# Patient Record
Sex: MALE | Race: BLACK OR AFRICAN AMERICAN | NOT HISPANIC OR LATINO | URBAN - METROPOLITAN AREA
[De-identification: names, ages, dates, MRNs, and addresses within clinical notes are randomized per-mention and may not be internally consistent; named-entity substitution may affect disease eponyms.]

---

## 2022-02-17 ENCOUNTER — EMERGENCY (EMERGENCY)
Facility: HOSPITAL | Age: 22
LOS: 1 days | Discharge: HOME | End: 2022-02-17
Attending: EMERGENCY MEDICINE | Admitting: EMERGENCY MEDICINE
Payer: COMMERCIAL

## 2022-02-17 VITALS
SYSTOLIC BLOOD PRESSURE: 100 MMHG | OXYGEN SATURATION: 99 % | HEART RATE: 67 BPM | DIASTOLIC BLOOD PRESSURE: 66 MMHG | TEMPERATURE: 99 F | RESPIRATION RATE: 16 BRPM

## 2022-02-17 VITALS
WEIGHT: 145.06 LBS | TEMPERATURE: 98 F | HEART RATE: 71 BPM | RESPIRATION RATE: 18 BRPM | HEIGHT: 68 IN | SYSTOLIC BLOOD PRESSURE: 117 MMHG | DIASTOLIC BLOOD PRESSURE: 73 MMHG | OXYGEN SATURATION: 98 %

## 2022-02-17 LAB
ALBUMIN SERPL ELPH-MCNC: 3.7 G/DL — SIGNIFICANT CHANGE UP (ref 3.3–5)
ALP SERPL-CCNC: 43 U/L — SIGNIFICANT CHANGE UP (ref 40–120)
ALT FLD-CCNC: 21 U/L — SIGNIFICANT CHANGE UP (ref 12–78)
ANION GAP SERPL CALC-SCNC: 4 MMOL/L — LOW (ref 5–17)
APTT BLD: 32.8 SEC — SIGNIFICANT CHANGE UP (ref 27.5–35.5)
AST SERPL-CCNC: 20 U/L — SIGNIFICANT CHANGE UP (ref 15–37)
BASOPHILS # BLD AUTO: 0 K/UL — SIGNIFICANT CHANGE UP (ref 0–0.2)
BASOPHILS NFR BLD AUTO: 0 % — SIGNIFICANT CHANGE UP (ref 0–2)
BILIRUB SERPL-MCNC: 0.2 MG/DL — SIGNIFICANT CHANGE UP (ref 0.2–1.2)
BUN SERPL-MCNC: 16 MG/DL — SIGNIFICANT CHANGE UP (ref 7–23)
CALCIUM SERPL-MCNC: 8.3 MG/DL — LOW (ref 8.5–10.1)
CHLORIDE SERPL-SCNC: 107 MMOL/L — SIGNIFICANT CHANGE UP (ref 96–108)
CO2 SERPL-SCNC: 30 MMOL/L — SIGNIFICANT CHANGE UP (ref 22–31)
CREAT SERPL-MCNC: 1.1 MG/DL — SIGNIFICANT CHANGE UP (ref 0.5–1.3)
EOSINOPHIL # BLD AUTO: 0 K/UL — SIGNIFICANT CHANGE UP (ref 0–0.5)
EOSINOPHIL NFR BLD AUTO: 0 % — SIGNIFICANT CHANGE UP (ref 0–6)
ERYTHROCYTE [SEDIMENTATION RATE] IN BLOOD: 2 MM/HR — SIGNIFICANT CHANGE UP (ref 0–15)
GLUCOSE SERPL-MCNC: 127 MG/DL — HIGH (ref 70–99)
HCT VFR BLD CALC: 43.4 % — SIGNIFICANT CHANGE UP (ref 39–50)
HGB BLD-MCNC: 14.6 G/DL — SIGNIFICANT CHANGE UP (ref 13–17)
INR BLD: 1.04 RATIO — SIGNIFICANT CHANGE UP (ref 0.88–1.16)
LYMPHOCYTES # BLD AUTO: 1.02 K/UL — SIGNIFICANT CHANGE UP (ref 1–3.3)
LYMPHOCYTES # BLD AUTO: 41 % — SIGNIFICANT CHANGE UP (ref 13–44)
MCHC RBC-ENTMCNC: 29.5 PG — SIGNIFICANT CHANGE UP (ref 27–34)
MCHC RBC-ENTMCNC: 33.6 GM/DL — SIGNIFICANT CHANGE UP (ref 32–36)
MCV RBC AUTO: 87.7 FL — SIGNIFICANT CHANGE UP (ref 80–100)
MONOCYTES # BLD AUTO: 0.27 K/UL — SIGNIFICANT CHANGE UP (ref 0–0.9)
MONOCYTES NFR BLD AUTO: 11 % — SIGNIFICANT CHANGE UP (ref 2–14)
NEUTROPHILS # BLD AUTO: 0.97 K/UL — LOW (ref 1.8–7.4)
NEUTROPHILS NFR BLD AUTO: 39 % — LOW (ref 43–77)
NRBC # BLD: SIGNIFICANT CHANGE UP /100 WBCS (ref 0–0)
PLATELET # BLD AUTO: 110 K/UL — LOW (ref 150–400)
POTASSIUM SERPL-MCNC: 3.5 MMOL/L — SIGNIFICANT CHANGE UP (ref 3.5–5.3)
POTASSIUM SERPL-SCNC: 3.5 MMOL/L — SIGNIFICANT CHANGE UP (ref 3.5–5.3)
PROT SERPL-MCNC: 6.7 G/DL — SIGNIFICANT CHANGE UP (ref 6–8.3)
PROTHROM AB SERPL-ACNC: 12.2 SEC — SIGNIFICANT CHANGE UP (ref 10.6–13.6)
RBC # BLD: 4.95 M/UL — SIGNIFICANT CHANGE UP (ref 4.2–5.8)
RBC # BLD: 4.95 M/UL — SIGNIFICANT CHANGE UP (ref 4.2–5.8)
RBC # FLD: 12.7 % — SIGNIFICANT CHANGE UP (ref 10.3–14.5)
RETICS #: 40.1 K/UL — SIGNIFICANT CHANGE UP (ref 25–125)
RETICS/RBC NFR: 0.8 % — SIGNIFICANT CHANGE UP (ref 0.5–2.5)
SARS-COV-2 RNA SPEC QL NAA+PROBE: DETECTED
SODIUM SERPL-SCNC: 141 MMOL/L — SIGNIFICANT CHANGE UP (ref 135–145)
WBC # BLD: 2.49 K/UL — LOW (ref 3.8–10.5)
WBC # FLD AUTO: 2.49 K/UL — LOW (ref 3.8–10.5)

## 2022-02-17 PROCEDURE — 87635 SARS-COV-2 COVID-19 AMP PRB: CPT

## 2022-02-17 PROCEDURE — 85730 THROMBOPLASTIN TIME PARTIAL: CPT

## 2022-02-17 PROCEDURE — 99283 EMERGENCY DEPT VISIT LOW MDM: CPT | Mod: 25

## 2022-02-17 PROCEDURE — 71046 X-RAY EXAM CHEST 2 VIEWS: CPT

## 2022-02-17 PROCEDURE — 80053 COMPREHEN METABOLIC PANEL: CPT

## 2022-02-17 PROCEDURE — 86900 BLOOD TYPING SEROLOGIC ABO: CPT

## 2022-02-17 PROCEDURE — 86850 RBC ANTIBODY SCREEN: CPT

## 2022-02-17 PROCEDURE — 99284 EMERGENCY DEPT VISIT MOD MDM: CPT

## 2022-02-17 PROCEDURE — 71046 X-RAY EXAM CHEST 2 VIEWS: CPT | Mod: 26

## 2022-02-17 PROCEDURE — 86901 BLOOD TYPING SEROLOGIC RH(D): CPT

## 2022-02-17 PROCEDURE — 85045 AUTOMATED RETICULOCYTE COUNT: CPT

## 2022-02-17 PROCEDURE — 85610 PROTHROMBIN TIME: CPT

## 2022-02-17 PROCEDURE — 85025 COMPLETE CBC W/AUTO DIFF WBC: CPT

## 2022-02-17 PROCEDURE — 85652 RBC SED RATE AUTOMATED: CPT

## 2022-02-17 PROCEDURE — 36415 COLL VENOUS BLD VENIPUNCTURE: CPT

## 2022-02-17 PROCEDURE — 86140 C-REACTIVE PROTEIN: CPT

## 2022-02-17 NOTE — ED PROVIDER NOTE - ATTENDING CONTRIBUTION TO CARE
21 male presents to ER with mother c/o abnormal blood work, had routine testing done at [facility] office 1/25/22, was noted to have a low white count and platelets, labs were repeated 2/16/22 and received a phone call that again was abnormal, patient is not covid vaccinated, had tactile fever and chills a few days ago with body aches, otherwise feeling well, patient alert and oriented, no acute distress, heart and lungs clear, adbomen soft, f/u labs, call heme/onc.

## 2022-02-17 NOTE — ED PROVIDER NOTE - OBJECTIVE STATEMENT
21 year old male presents with abnormal blood work, sent in by PCP. had routine blood work Jan 25 which showed WBC of 3.3 and Platelets 123. had repeat CBC yesterday to recheck the labs and WBC 1.9 and platelets 92. sent to ED to "eval for acute Hodgkin's lymphoma vs myelodysplasia". reports subjective low grade fever past few days (no fever today and did not take temp). mild malaise yesterday but contributed it to not eating much. denies any pain or complaints today. denies recent illnesses. not covid vaccinated   PCP cristian Ponce 21 year old male presents with abnormal blood work, sent in by PCP. had routine blood work Jan 25 which showed WBC of 3.3 and Platelets 123. had repeat CBC yesterday to recheck the labs and WBC 1.9 and platelets 92. sent to ED to "eval for acute Hodgkin's lymphoma vs myelodysplasia". reports subjective low grade fever past few days (no fever today and did not take temp). mild malaise yesterday but contributed it to not eating much. denies any pain or complaints today. "feeling back to normal". denies recent illnesses. not covid vaccinated   PCP cristian Ponce

## 2022-02-17 NOTE — ED PROVIDER NOTE - NSFOLLOWUPINSTRUCTIONS_ED_ALL_ED_FT
drink plenty of fluids  tylenol over the counter for pain  have repeat blood work 2 weeks by primary care provider   follow up with hematologist 1 month for recheck if lab abnormalities do not resolve   return to ED for worsening symptoms       COVID-19 (Coronavirus Disease 2019)    WHAT YOU NEED TO KNOW:    COVID-19 is the disease caused by a coronavirus first discovered in December 2019. Coronaviruses generally cause upper respiratory (nose, throat, and lung) infections, such as a cold. The 2019 virus spreads quickly and easily. It can be spread starting 2 to 3 days before symptoms even begin.    DISCHARGE INSTRUCTIONS:    Call your local emergency number (911 in the ) if:   •You have trouble breathing or shortness of breath at rest.      •You have chest pain or pressure that lasts longer than 5 minutes.      •You become confused or hard to wake.      •Your lips or face are blue.      Return to the emergency department if:   •You have a fever of 104°F (40°C) or higher.          Call your doctor if:   •You have symptoms of COVID-19.      •You have questions or concerns about your condition or care.      Medicines: You may need any of the following:   •Decongestants help reduce nasal congestion and help you breathe more easily. If you take decongestant pills, they may make you feel restless or cause problems with your sleep. Do not use decongestant sprays for more than a few days.      •Cough suppressants help reduce coughing. Ask your healthcare provider which type of cough medicine is best for you.      •Acetaminophen decreases pain and fever. It is available without a doctor's order. Ask how much to take and how often to take it. Follow directions. Read the labels of all other medicines you are using to see if they also contain acetaminophen, or ask your doctor or pharmacist. Acetaminophen can cause liver damage if not taken correctly. Do not use more than 4 grams (4,000 milligrams) total of acetaminophen in one day.       •NSAIDs, such as ibuprofen, help decrease swelling, pain, and fever. This medicine is available with or without a doctor's order. NSAIDs can cause stomach bleeding or kidney problems in certain people. If you take blood thinner medicine, always ask your healthcare provider if NSAIDs are safe for you. Always read the medicine label and follow directions.      •Blood thinners help prevent blood clots. Clots can cause strokes, heart attacks, and death. The following are general safety guidelines to follow while you are taking a blood thinner:?Watch for bleeding and bruising while you take blood thinners. Watch for bleeding from your gums or nose. Watch for blood in your urine and bowel movements. Use a soft washcloth on your skin, and a soft toothbrush to brush your teeth. This can keep your skin and gums from bleeding. If you shave, use an electric shaver. Do not play contact sports.       ?Tell your dentist and other healthcare providers that you take a blood thinner. Wear a bracelet or necklace that says you take this medicine.       ?Do not start or stop any other medicines unless your healthcare provider tells you to. Many medicines cannot be used with blood thinners.      ?Take your blood thinner exactly as prescribed by your healthcare provider. Do not skip does or take less than prescribed. Tell your provider right away if you forget to take your blood thinner, or if you take too much.      ?Warfarin is a blood thinner that you may need to take. The following are things you should be aware of if you take warfarin: ?Foods and medicines can affect the amount of warfarin in your blood. Do not make major changes to your diet while you take warfarin. Warfarin works best when you eat about the same amount of vitamin K every day. Vitamin K is found in green leafy vegetables and certain other foods. Ask for more information about what to eat when you are taking warfarin.      ?You will need to see your healthcare provider for follow-up visits when you are on warfarin. You will need regular blood tests. These tests are used to decide how much medicine you need.         •Take your medicine as directed. Contact your healthcare provider if you think your medicine is not helping or if you have side effects. Tell him or her if you are allergic to any medicine. Keep a list of the medicines, vitamins, and herbs you take. Include the amounts, and when and why you take them. Bring the list or the pill bottles to follow-up visits. Carry your medicine list with you in case of an emergency.      What you need to know about variants: The virus has changed into several new forms (called variants) since it was discovered. The variants may be more contagious (easily spread) than the original form. Some may also cause more severe illness than others.    What you need to know about COVID-19 vaccines: Healthcare providers recommend a COVID-19 vaccine, even if you have already had COVID-19. You are considered fully vaccinated against COVID-19 two weeks after the final dose of any COVID-19 vaccine. Let your healthcare provider know when you have received the final dose of the vaccine. Make a copy of your vaccination card. Keep the original with you in case you need to show it. Keep the copy in a safe place.  •COVID-19 vaccines are given as a shot in 1 or 2 doses.   Vaccination is recommended for everyone 5 years or older. One 2-dose vaccine is fully approved for those 16 or older. This vaccine also has an emergency use authorization (EUA) for children 5 to 15 years old. No vaccine is currently available for children younger than 5 years. A booster (additional) dose is given to help the immune system continue to protect against severe COVID-19.?A booster is recommended for all adults 18 or older. The booster can be a different brand of the COVID-19 vaccine than you originally received. The timing for the booster depends on the type of vaccine you received:?1-dose vaccine: The booster is given at least 2 months after you received the vaccine.      ?2-dose vaccine: The booster is given at least 6 months after the second dose.      ?A booster can be given to adolescents 16 to 17 years old. Only 1 COVID-19 vaccine has an EUA for adolescent boosters. The booster is given at least 6 months after the second dose of the original vaccine series.    COVID-19 Immunization Schedule         •Continue social distancing and other measures, even after you get the vaccine. Although it is not common, you can become infected after you get the vaccine. You may also be able to pass the virus to others without knowing you are infected.      •After you get the vaccine, check local, national, and international travel rules. You may need to be tested before you travel. Some countries require proof of a negative test before you travel. You may also need to quarantine after you return.      How the 2019 coronavirus spreads:   •Droplets are the main way all coronaviruses spread. The virus travels in droplets that form when a person talks, sings, coughs, or sneezes. The droplets can also float in the air for minutes or hours. Infection happens when you breathe in the droplets or get them in your eyes or nose. Close personal contact with an infected person increases your risk for infection. This means being within 6 feet (2 meters) of the person for at least 15 minutes over 24 hours.      •Person-to-person contact can spread the virus. For example, a person with the virus on his or her hands can spread it by shaking hands with someone.      •The virus can stay on objects and surfaces for up to 3 days. You may become infected by touching the object or surface and then touching your eyes or mouth.      Help lower the risk for COVID-19:   •Wash your hands often throughout the day. Use soap and water. Rub your soapy hands together, lacing your fingers, for at least 20 seconds. Rinse with warm, running water. Dry your hands with a clean towel or paper towel. Use hand  that contains alcohol if soap and water are not available. Teach children how to wash their hands and use hand .  Handwashing           •Cover sneezes and coughs. Turn your face away and cover your mouth and nose with a tissue. Throw the tissue away. Use the bend of your arm if a tissue is not available. Then wash your hands well with soap and water or use hand . Teach children how to cover a cough or sneeze.      •Wear a face covering (mask) when needed. Use a cloth covering with at least 2 layers. You can also create layers by putting a cloth covering over a disposable non-medical mask. Cover your mouth and your nose.  How to Wear a Face Covering (Mask)           •Follow worldwide, national, and local social distancing guidelines. Keep at least 6 feet (2 meters) between you and others.      •Try not to touch your face. If you get the virus on your hands, you can transfer it to your eyes, nose, or mouth and become infected. You can also transfer it to objects, surfaces, or people.      •Clean and disinfect high-touch surfaces and objects often. Use disinfecting wipes, or make a solution of 4 teaspoons of bleach in 1 quart (4 cups) of water.      •Ask about other vaccines you may need. Get the influenza (flu) vaccine as soon as recommended each year, usually starting in September or October. Get the pneumonia vaccine if recommended. Your healthcare provider can tell you if you should also get other vaccines, and when to get them.    Prevent COVID-19 Infection         Follow social distancing guidelines: National and local social distancing rules vary. Rules and restrictions may change over time as restrictions are lifted. The following are general guidelines:   •Stay home if you are sick or think you may have COVID-19. It is important to stay home if you are waiting for a testing appointment or for test results.      •Avoid close physical contact with anyone who does not live in your home. Do not shake hands with, hug, or kiss a person as a greeting. If you must use public transportation (such as a bus or subway), try to sit or stand away from others. Wear your face covering.      •Avoid in-person gatherings and crowds. Attend virtually if possible.      Follow up with your doctor as directed: Write down your questions so you remember to ask them during your visits.    For more information:   •Centers for Disease Control and Prevention  [address] [place] [address]  Phone: 1-625.967.5168  Web Address: http://www.cdc.gov

## 2022-02-17 NOTE — ED PROVIDER NOTE - CARE PROVIDER_API CALL
Keven Ponce (DO)  Family Medicine  [address] [place], [place] [address]  Phone: [phone]  Fax: [phone]  Follow Up Time: 1-3 Days   Keven Ponce (DO)  Family Medicine  [address] [place], [place] [address]  Phone: [phone]  Fax: [phone]  Follow Up Time: 1-3 Days    Hon MANSI Cardona (MD)  Hematology; Internal Medicine; Medical Oncology  [address] [facility], [place] [address]  Phone: [phone]  Fax: [phone]  Follow Up Time: 7-10 Days

## 2022-02-17 NOTE — ED PROVIDER NOTE - PATIENT PORTAL LINK FT
You can access the FollowMyHealth Patient Portal offered by [facility] by registering at the following website: [email or web address]. By joining [facility]’s FollowMyHealth portal, you will also be able to view your health information using other applications (apps) compatible with our system.

## 2022-02-17 NOTE — ED PROVIDER NOTE - PROVIDER TOKENS
PROVIDER:[TOKEN:[[id number]],FOLLOWUP:[1-3 Days]] PROVIDER:[TOKEN:[[id number]],FOLLOWUP:[1-3 Days]],PROVIDER:[TOKEN:[[id number]],FOLLOWUP:[7-10 Days]]

## 2022-02-17 NOTE — ED PROVIDER NOTE - CLINICAL SUMMARY MEDICAL DECISION MAKING FREE TEXT BOX
sent in for abnormal labs (low white count and platelets). denies current pain or complaints. will consult hematology, repeat labs

## 2022-02-17 NOTE — ED PROVIDER NOTE - PROGRESS NOTE DETAILS
spoke with Dr. Cardona (hematology). will see patient in ED. recommended repeat labs including retic count, B12, folic acid, type and screen, PT/INR, and swab for covid. patient resting comfortably. vitals stable. no pain or complaints spoke with Dr. Cardona, has reviewed all lab results. white count and platelet count improved from yesterday. +covid. suspect lab results due to covid. patient resting comfortably and appears well. no current complaints. symptomatic tx for covid discussed. no tachycardia or hypoxia. recommends repeat blood work in 2 weeks and follow up in hematology office 1 month (recommends to have negative rapid covid test before visit). patient and mother comfortable with plan

## 2022-02-17 NOTE — ED PROVIDER NOTE - CARE PLAN
Principal Discharge DX:	Thrombocytopenia  Secondary Diagnosis:	Leukopenia   1 Principal Discharge DX:	2019 novel coronavirus disease (COVID-19)  Secondary Diagnosis:	Leukopenia  Secondary Diagnosis:	Thrombocytopenia due to COVID-19 virus

## 2022-02-18 LAB — CRP SERPL-MCNC: <3 MG/L — SIGNIFICANT CHANGE UP

## 2022-06-26 ENCOUNTER — FORM ENCOUNTER (OUTPATIENT)
Age: 22
End: 2022-06-26

## 2022-06-26 PROBLEM — Z78.9 OTHER SPECIFIED HEALTH STATUS: Chronic | Status: ACTIVE | Noted: 2022-02-17

## 2022-06-27 ENCOUNTER — APPOINTMENT (OUTPATIENT)
Dept: MRI IMAGING | Facility: CLINIC | Age: 22
End: 2022-06-27
Payer: COMMERCIAL

## 2022-06-27 ENCOUNTER — APPOINTMENT (OUTPATIENT)
Dept: ORTHOPEDIC SURGERY | Facility: CLINIC | Age: 22
End: 2022-06-27
Payer: COMMERCIAL

## 2022-06-27 VITALS — WEIGHT: 135 LBS | BODY MASS INDEX: 20.46 KG/M2 | HEIGHT: 68 IN

## 2022-06-27 DIAGNOSIS — S86.011A STRAIN OF RIGHT ACHILLES TENDON, INITIAL ENCOUNTER: ICD-10-CM

## 2022-06-27 DIAGNOSIS — S86.811A STRAIN OF OTHER MUSCLE(S) AND TENDON(S) AT LOWER LEG LEVEL, RIGHT LEG, INITIAL ENCOUNTER: ICD-10-CM

## 2022-06-27 PROCEDURE — 99204 OFFICE O/P NEW MOD 45 MIN: CPT

## 2022-06-27 PROCEDURE — 73610 X-RAY EXAM OF ANKLE: CPT | Mod: RT

## 2022-06-27 PROCEDURE — 73721 MRI JNT OF LWR EXTRE W/O DYE: CPT | Mod: RT

## 2022-06-27 PROCEDURE — 73590 X-RAY EXAM OF LOWER LEG: CPT | Mod: RT

## 2022-06-27 RX ORDER — NAPROXEN 500 MG/1
500 TABLET ORAL TWICE DAILY
Qty: 60 | Refills: 0 | Status: ACTIVE | COMMUNITY
Start: 2022-06-27 | End: 1900-01-01

## 2022-06-27 NOTE — ASSESSMENT
[FreeTextEntry1] : right achilles tear\par \par - The patient was advised of the diagnosis.  The natural history of the pathology was explained to the patient in layman's terms.  Several different treatment options were discussed and explained including the risks and benefits of both surgical and non-surgical treatments.  All questions and concerns were answered.\par - The patient was advised to modify their activities.\par - Patient was given a prescription for an anti-inflammatory medication.  They will take it for the next week and then on an as needed basis, as long as there are no medical contra-indications.  Patient is counseled on possible GI, renal, and cardiovascular side effects.\par - mri to eval tear of achilles\par - boot\par - fu after mri with ankle doc discuss poss surgery

## 2022-06-27 NOTE — HISTORY OF PRESENT ILLNESS
[de-identified] : 21 year old male  ([employer], basketball rec.)   right lower leg pain since 6/24/2022 while playing basketball and felt immediate localized discomfort.  pain located at the area of the achilles with associated swelling and strength, ROM loss.  pt. is currenlty non WB in a splint with crutches provided by [facility].\par

## 2022-06-27 NOTE — IMAGING
[Right] : right tibia/fibula [There are no fractures, subluxations or dislocations. No significant abnormalities are seen] : There are no fractures, subluxations or dislocations. No significant abnormalities are seen [de-identified] : \par \par  RIGHT CALF / ANKLE\par Inspection:  swelling\par Palpation: achilles and calf tenderness\par Knee and Ankle Range of Motion: normal motion but pain with ankle motion\par Strength: \par ankle dorsiflexion strength is 5/5  \par ankle plantar flexion strength is 3+/5  \par ankle inversion strength is 5/5  \par ankle eversion strength is 5/5\par Kingsley test: pos\par Neurovascular intact distally\par Gait: antalgic\par \par

## 2022-06-29 ENCOUNTER — APPOINTMENT (OUTPATIENT)
Dept: ORTHOPEDIC SURGERY | Facility: CLINIC | Age: 22
End: 2022-06-29
Payer: COMMERCIAL

## 2022-06-29 PROCEDURE — 99215 OFFICE O/P EST HI 40 MIN: CPT

## 2022-06-29 NOTE — DATA REVIEWED
[MRI] : MRI [Right] : of the right [Ankle] : ankle [Report was reviewed and noted in the chart] : The report was reviewed and noted in the chart [I independently reviewed and interpreted images and report] : I independently reviewed and interpreted images and report [FreeTextEntry1] : 1. Findings consistent with acute complete Achilles tendon disruption centered 6 cm above the calcaneal \par insertion with a 1.7 cm gap and approximately 2 cm of tendon remaining above the tear with moderate \par surrounding soft tissue swelling consistent with acute traumatic injury.\par 2. Mild posterior tibial tenosynovitis, mild flexor hallucis longus tenosynovitis, and mild deltoid sprain with \par surrounding synovitis with moderate diffuse soft tissue swelling throughout the posterior aspect of the ankle and \par mild plantar fascial thickening without tear.

## 2022-06-29 NOTE — ASSESSMENT
[FreeTextEntry1] : The patient was explained that they have a rupture of the Achilles tendon. Both nonoperative and operative treatment options and associated risks and benefits were discussed with the patient. The patient was explained that both options can be successful.  Nonoperative treatment can take longer to return to full activities, and can be associated with a higher re-rupture rate. The most common complication with surgery is with the incision site having some sort of breakdown and/or infection. The patient has opted for surgical intervention.\par \par The risks and benefits of surgery have been discussed. Risks include but are not limited to bleeding, infection, reaction to anesthesia, injury to blood vessels and nerves, malunion, nonunion, necessity of repeat surgery, chronic pain, loss of limb and death. The patient understands the risks and agrees with the surgical plan. Details of the procedure and postoperative protocol were discussed at length. All questions have been answered.\par \par Continue NWB in CAM boot with heel lifts.\par Ice to affected area. \par

## 2022-06-29 NOTE — PHYSICAL EXAM
[Right] : right foot and ankle [3___] : plantar flexion 3[unfilled]/5 [4___] : eversion 4[unfilled]/5 [5___] : [facility] 5[unfilled]/5 [2+] : posterior tibialis pulse: 2+ [Normal] : saphenous nerve sensation normal [] : no pain when stressing lateral tarsal metatarsal joint [FreeTextEntry3] : Palpable gap achilles tendon.  [de-identified] : NWB in CAM boot, using crutches.  [de-identified] : plantar flexion 30 degrees [de-identified] : inversion 15 degrees [de-identified] : eversion 10 degrees [TWNoteComboBox7] : dorsiflexion 0 degrees

## 2022-06-29 NOTE — HISTORY OF PRESENT ILLNESS
[Sports related] : sports related [Sudden] : sudden [Intermittent] : intermittent [Household chores] : household chores [Leisure] : leisure [Work] : work [Social interactions] : social interactions [Rest] : rest [Sitting] : sitting [Standing] : standing [Walking] : walking [Stairs] : stairs [4] : 4 [Dull/Aching] : dull/aching [Localized] : localized [de-identified] : Pt. is a 21 year old male who presents for evaluation of his RT ankle. Reports injury while playing basketball on 6/24/22. He was evaluated at [facility] and then at [facility] on 6/27/22. He presents today NWB in CAM boot with heel lifts, using crutches. No previous injury/problem with RT ankle. MRI was consistent with achilles rupture.  [] : Post Surgical Visit: no [FreeTextEntry1] : ANY Weeks

## 2022-07-01 ENCOUNTER — LABORATORY RESULT (OUTPATIENT)
Age: 22
End: 2022-07-01

## 2022-07-04 RX ORDER — HYDROCODONE BITARTRATE AND ACETAMINOPHEN 7.5; 325 MG/1; MG/1
7.5-325 TABLET ORAL
Qty: 42 | Refills: 0 | Status: ACTIVE | COMMUNITY
Start: 2022-07-04

## 2022-07-05 ENCOUNTER — APPOINTMENT (OUTPATIENT)
Age: 22
End: 2022-07-05

## 2022-07-05 PROCEDURE — 29515 APPLICATION SHORT LEG SPLINT: CPT | Mod: 59,RT

## 2022-07-05 PROCEDURE — 27650 REPAIR ACHILLES TENDON: CPT | Mod: RT

## 2022-07-11 ENCOUNTER — APPOINTMENT (OUTPATIENT)
Dept: ORTHOPEDIC SURGERY | Facility: CLINIC | Age: 22
End: 2022-07-11

## 2022-07-11 VITALS — BODY MASS INDEX: 20.46 KG/M2 | HEIGHT: 68 IN | WEIGHT: 135 LBS

## 2022-07-11 PROCEDURE — 99024 POSTOP FOLLOW-UP VISIT: CPT

## 2022-07-11 PROCEDURE — 29515 APPLICATION SHORT LEG SPLINT: CPT

## 2022-07-11 NOTE — ASSESSMENT
[FreeTextEntry1] : Splint taken down - soft padding wet down to incision.  Incision clean with Xeroform in place.  Sterile 4x4s placed.  Splint replaced.  NWB.  See Dr. Jackson this week

## 2022-07-11 NOTE — REASON FOR VISIT
[FreeTextEntry2] : SP R Achilles repair with Dr. Jackson on 7/5.  Got his splint wet on 7/8.  No fcs.

## 2022-07-14 ENCOUNTER — APPOINTMENT (OUTPATIENT)
Dept: ORTHOPEDIC SURGERY | Facility: CLINIC | Age: 22
End: 2022-07-14

## 2022-07-14 VITALS — WEIGHT: 135 LBS | BODY MASS INDEX: 20.46 KG/M2 | HEIGHT: 68 IN

## 2022-07-14 DIAGNOSIS — Z78.9 OTHER SPECIFIED HEALTH STATUS: ICD-10-CM

## 2022-07-14 PROCEDURE — 99213 OFFICE O/P EST LOW 20 MIN: CPT

## 2022-07-14 NOTE — HISTORY OF PRESENT ILLNESS
[Sports related] : sports related [Sudden] : sudden [4] : 4 [0] : 0 [Dull/Aching] : dull/aching [Localized] : localized [Intermittent] : intermittent [Household chores] : household chores [Leisure] : leisure [Work] : work [Social interactions] : social interactions [Rest] : rest [Sitting] : sitting [Standing] : standing [Walking] : walking [Stairs] : stairs [Not working due to injury] : Work status: not working due to injury [de-identified] : Patient returns s/p right achilles tendon repair on 7/5/22.  He has been nwb in his splint.  He is taking his daily aspirin.  No fevers. Pain well controlled.  No other complaints.   [] : no [FreeTextEntry1] : ANY Weeks [de-identified] : 7-5-22 [de-identified] : 7=5=22

## 2022-07-14 NOTE — ASSESSMENT
[FreeTextEntry1] : Patient is doing well. Sutures removed today.  He will start active ROM exercises in 5 days.\par He will remain nwb in his cam walker with heel lifts ( he has it at home).  He will continue with the daily aspirin.\par

## 2022-07-14 NOTE — PHYSICAL EXAM
[Right] : right foot and ankle [4___] : eversion 4[unfilled]/5 [2+] : posterior tibialis pulse: 2+ [Normal] : saphenous nerve sensation normal [Mild] : mild swelling over achilles tendon [] : no pain when stressing lateral tarsal metatarsal joint [de-identified] : NWB in CAM boot, using crutches.  [de-identified] : plantar flexion 30 degrees [de-identified] : inversion 15 degrees [de-identified] : eversion 10 degrees [TWNoteComboBox7] : dorsiflexion 0 degrees

## 2022-08-04 ENCOUNTER — APPOINTMENT (OUTPATIENT)
Dept: ORTHOPEDIC SURGERY | Facility: CLINIC | Age: 22
End: 2022-08-04

## 2022-08-04 VITALS — WEIGHT: 135 LBS | BODY MASS INDEX: 20.46 KG/M2 | HEIGHT: 68 IN

## 2022-08-04 PROCEDURE — 99024 POSTOP FOLLOW-UP VISIT: CPT

## 2022-08-04 NOTE — PHYSICAL EXAM
[Right] : right foot and ankle [Mild] : mild swelling over achilles tendon [2+] : posterior tibialis pulse: 2+ [Normal] : saphenous nerve sensation normal [NL (40)] : plantar flexion 40 degrees [NL 30)] : inversion 30 degrees [NL (20)] : eversion 20 degrees [4___] : plantar flexion 4[unfilled]/5 [5___] : eversion 5[unfilled]/5 [] : no pain when stressing lateral tarsal metatarsal joint [de-identified] : Resting tension of achilles tendon is within 5 degrees of the other side. [de-identified] : NWB in CAM boot, using crutches.  [de-identified] : plantar flexion 30 degrees [de-identified] : inversion 15 degrees [de-identified] : eversion 10 degrees [TWNoteComboBox7] : dorsiflexion 10 degrees

## 2022-08-04 NOTE — ASSESSMENT
[FreeTextEntry1] : Patient is doing well. He can now be wbat in his cam walker with heel lifts.  He will start formal PT (with no passive dorsiflexion) and continue with active ROM exercises.  He no longer needs to take the daily aspirin.\par

## 2022-08-04 NOTE — PHYSICAL EXAM
[Right] : right foot and ankle [Mild] : mild swelling over achilles tendon [2+] : posterior tibialis pulse: 2+ [Normal] : saphenous nerve sensation normal [NL (40)] : plantar flexion 40 degrees [NL 30)] : inversion 30 degrees [NL (20)] : eversion 20 degrees [4___] : plantar flexion 4[unfilled]/5 [5___] : eversion 5[unfilled]/5 [] : no pain when stressing lateral tarsal metatarsal joint [de-identified] : Resting tension of achilles tendon is within 5 degrees of the other side. [de-identified] : NWB in CAM boot, using crutches.  [de-identified] : plantar flexion 30 degrees [de-identified] : inversion 15 degrees [de-identified] : eversion 10 degrees [TWNoteComboBox7] : dorsiflexion 10 degrees

## 2022-08-08 NOTE — HISTORY OF PRESENT ILLNESS
[4] : 4 [Dull/Aching] : dull/aching [Localized] : localized [Intermittent] : intermittent [Household chores] : household chores [Leisure] : leisure [Work] : work [Social interactions] : social interactions [Rest] : rest [Sitting] : sitting [Standing] : standing [Walking] : walking [Stairs] : stairs [Not working due to injury] : Work status: not working due to injury [Sports related] : sports related [Sudden] : sudden [0] : 0 [de-identified] : Patient returns s/p right achilles tendon repair on 7/5/22.  He has been nwb in his cam walker and doing home exercises.  He is taking his daily aspirin.  No pain at this time.  No other complaints. [de-identified] : 7-5-22 [de-identified] : 7=5=22 [] : Patient is currently playing sports: no [FreeTextEntry1] : right ankle [FreeTextEntry3] : 06/24/2022 [FreeTextEntry5] : pt was playing basketball and landed wrong

## 2022-08-08 NOTE — HISTORY OF PRESENT ILLNESS
[4] : 4 [Dull/Aching] : dull/aching [Localized] : localized [Intermittent] : intermittent [Household chores] : household chores [Leisure] : leisure [Work] : work [Social interactions] : social interactions [Rest] : rest [Sitting] : sitting [Standing] : standing [Walking] : walking [Stairs] : stairs [Not working due to injury] : Work status: not working due to injury [Sports related] : sports related [Sudden] : sudden [0] : 0 [de-identified] : Patient returns s/p right achilles tendon repair on 7/5/22.  He has been nwb in his cam walker and doing home exercises.  He is taking his daily aspirin.  No pain at this time.  No other complaints. [de-identified] : 7-5-22 [de-identified] : 7=5=22 [] : Patient is currently playing sports: no [FreeTextEntry1] : right ankle [FreeTextEntry3] : 06/24/2022 [FreeTextEntry5] : pt was playing basketball and landed wrong

## 2022-09-01 ENCOUNTER — APPOINTMENT (OUTPATIENT)
Dept: ORTHOPEDIC SURGERY | Facility: CLINIC | Age: 22
End: 2022-09-01

## 2022-09-01 DIAGNOSIS — Z00.00 ENCOUNTER FOR GENERAL ADULT MEDICAL EXAMINATION W/OUT ABNORMAL FINDINGS: ICD-10-CM

## 2022-09-01 PROCEDURE — 99024 POSTOP FOLLOW-UP VISIT: CPT

## 2022-09-01 NOTE — HISTORY OF PRESENT ILLNESS
[Sports related] : sports related [Sudden] : sudden [0] : 0 [Dull/Aching] : dull/aching [Localized] : localized [Intermittent] : intermittent [Household chores] : household chores [Leisure] : leisure [Work] : work [Social interactions] : social interactions [Rest] : rest [Sitting] : sitting [Standing] : standing [Walking] : walking [Stairs] : stairs [Not working due to injury] : Work status: not working due to injury [1] : 2 [de-identified] : Patient returns s/p right achilles tendon repair on 7/5/22.  He has been wb in sneakers and doing PT/home exercises. He reports doing well and is happy with his progress. [] : no [FreeTextEntry1] : ANY Weeks [de-identified] : 7-5-22 [de-identified] : 7/5/22 [de-identified] : right achilles tendon repair

## 2022-09-01 NOTE — ASSESSMENT
[FreeTextEntry1] : Patient continues to do well.  He will continue to be wbat with sneakers.  PT and home exercises to be continued.  No high impact activities.

## 2022-09-01 NOTE — PHYSICAL EXAM
[Right] : right foot and ankle [NL (40)] : plantar flexion 40 degrees [NL 30)] : inversion 30 degrees [NL (20)] : eversion 20 degrees [4___] : plantar flexion 4[unfilled]/5 [5___] : eversion 5[unfilled]/5 [2+] : posterior tibialis pulse: 2+ [Normal] : saphenous nerve sensation normal [] : no pain when stressing lateral tarsal metatarsal joint [de-identified] : Resting tension of achilles tendon is within 5 degrees of the other side. [de-identified] : NWB in CAM boot, using crutches.  [TWNoteComboBox7] : dorsiflexion 15 degrees

## 2022-10-06 ENCOUNTER — APPOINTMENT (OUTPATIENT)
Dept: ORTHOPEDIC SURGERY | Facility: CLINIC | Age: 22
End: 2022-10-06

## 2022-10-06 PROCEDURE — 99213 OFFICE O/P EST LOW 20 MIN: CPT

## 2022-10-06 NOTE — ASSESSMENT
[FreeTextEntry1] : Patient is making consistent progress and will continue to be wbat with sneakers.  PT and home exercises to be continued.  No high impact activities.

## 2022-10-06 NOTE — HISTORY OF PRESENT ILLNESS
[Sports related] : sports related [Sudden] : sudden [1] : 2 [0] : 0 [Dull/Aching] : dull/aching [Localized] : localized [Intermittent] : intermittent [Household chores] : household chores [Leisure] : leisure [Work] : work [Social interactions] : social interactions [Rest] : rest [Sitting] : sitting [Standing] : standing [Walking] : walking [Stairs] : stairs [Not working due to injury] : Work status: not working due to injury [de-identified] : Patient returns s/p right achilles tendon repair on 7/5/22.  He has been wb in sneakers and doing PT/home exercises. He reports consistent improvement.  No complaints today. [] : no [FreeTextEntry1] : ANY Weeks [de-identified] : 7-5-22 [de-identified] : 7/5/22 [de-identified] : right achilles tendon repair

## 2022-10-06 NOTE — PHYSICAL EXAM
[Right] : right foot and ankle [NL (40)] : plantar flexion 40 degrees [NL 30)] : inversion 30 degrees [NL (20)] : eversion 20 degrees [4___] : plantar flexion 4[unfilled]/5 [5___] : eversion 5[unfilled]/5 [2+] : posterior tibialis pulse: 2+ [Normal] : saphenous nerve sensation normal [] : no pain when stressing lateral tarsal metatarsal joint [FreeTextEntry8] : Minimal tenderness at achilles tendon [de-identified] : Resting tension of achilles tendon is within 5 degrees of the other side. [de-identified] : NWB in CAM boot, using crutches.  [TWNoteComboBox7] : dorsiflexion 15 degrees

## 2022-11-03 ENCOUNTER — APPOINTMENT (OUTPATIENT)
Dept: ORTHOPEDIC SURGERY | Facility: CLINIC | Age: 22
End: 2022-11-03

## 2022-11-07 ENCOUNTER — APPOINTMENT (OUTPATIENT)
Dept: ORTHOPEDIC SURGERY | Facility: CLINIC | Age: 22
End: 2022-11-07

## 2022-11-07 PROCEDURE — 99213 OFFICE O/P EST LOW 20 MIN: CPT

## 2022-11-07 NOTE — HISTORY OF PRESENT ILLNESS
[Sports related] : sports related [Sudden] : sudden [1] : 2 [0] : 0 [Dull/Aching] : dull/aching [Localized] : localized [Intermittent] : intermittent [Household chores] : household chores [Leisure] : leisure [Work] : work [Social interactions] : social interactions [Rest] : rest [Sitting] : sitting [Standing] : standing [Walking] : walking [Stairs] : stairs [Not working due to injury] : Work status: not working due to injury [de-identified] : Patient returns s/p right Achilles tendon repair on 7/5/22.  He has been WB in [employer].  He stopped PT and two weeks ago.  He feels that he is definitely improving, but is still not yet 100%. [] : no [FreeTextEntry1] : ANY Weeks [de-identified] : 7-5-22 [de-identified] : 7/5/22 [de-identified] : right Achilles tendon repair

## 2022-11-07 NOTE — PHYSICAL EXAM
[Right] : right foot and ankle [NL (40)] : plantar flexion 40 degrees [NL 30)] : inversion 30 degrees [4___] : plantar flexion 4[unfilled]/5 [5___] : eversion 5[unfilled]/5 [2+] : posterior tibialis pulse: 2+ [Normal] : saphenous nerve sensation normal [NL (20)] : dorsiflexion 20 degrees [] : no pain when stressing lateral tarsal metatarsal joint [FreeTextEntry8] : Minimal tenderness at Achilles tendon [de-identified] : Resting tension of Achilles tendon is within 5 degrees of the other side. [de-identified] : NWB in CAM boot, using crutches.  [TWNoteComboBox7] : dorsiflexion 15 degrees

## 2022-11-07 NOTE — ASSESSMENT
[FreeTextEntry1] : Patient continues to improve.\par PT and home exercises are still recommended. Elidel Counseling: Patient may experience a mild burning sensation during topical application. Elidel is not approved in children less than 2 years of age. There have been case reports of hematologic and skin malignancies in patients using topical calcineurin inhibitors although causality is questionable.

## 2023-01-05 ENCOUNTER — APPOINTMENT (OUTPATIENT)
Dept: ORTHOPEDIC SURGERY | Facility: CLINIC | Age: 23
End: 2023-01-05
Payer: COMMERCIAL

## 2023-01-05 DIAGNOSIS — S86.011D STRAIN OF RIGHT ACHILLES TENDON, SUBSEQUENT ENCOUNTER: ICD-10-CM

## 2023-01-05 DIAGNOSIS — Z98.890 OTHER SPECIFIED POSTPROCEDURAL STATES: ICD-10-CM

## 2023-01-05 PROCEDURE — 99213 OFFICE O/P EST LOW 20 MIN: CPT

## 2023-01-05 NOTE — PHYSICAL EXAM
[Right] : right foot and ankle [NL (40)] : plantar flexion 40 degrees [NL 30)] : inversion 30 degrees [NL (20)] : eversion 20 degrees [4___] : plantar flexion 4[unfilled]/5 [5___] : eversion 5[unfilled]/5 [2+] : posterior tibialis pulse: 2+ [Normal] : saphenous nerve sensation normal [] : non-antalgic [FreeTextEntry8] : Minimal tenderness at Achilles tendon [de-identified] : Resting tension of Achilles tendon is within 5 degrees of the other side.

## 2023-01-05 NOTE — HISTORY OF PRESENT ILLNESS
[Sports related] : sports related [Sudden] : sudden [1] : 2 [0] : 0 [Dull/Aching] : dull/aching [Localized] : localized [Intermittent] : intermittent [Household chores] : household chores [Leisure] : leisure [Work] : work [Social interactions] : social interactions [Rest] : rest [Sitting] : sitting [Standing] : standing [Walking] : walking [Stairs] : stairs [Not working due to injury] : Work status: not working due to injury [de-identified] : Patient returns s/p right Achilles tendon repair on 7/5/22.  [facility] [employer] [employer]  Stopped PT but doing HEP.  He feels that he is improving, but is still not yet 100%. [] : no [FreeTextEntry1] : ANY Weeks [de-identified] : 7-5-22 [de-identified] : 7/5/22 [de-identified] : right Achilles tendon repair

## 2023-01-05 NOTE — ASSESSMENT
[FreeTextEntry1] : Patient is doing well.  He was told that his strength may not return to 100%.\par He will continue with home exercises and can increase his activity level as tolerated.\par He will return if he has any issues.